# Patient Record
Sex: MALE | URBAN - METROPOLITAN AREA
[De-identification: names, ages, dates, MRNs, and addresses within clinical notes are randomized per-mention and may not be internally consistent; named-entity substitution may affect disease eponyms.]

---

## 2020-11-04 ENCOUNTER — APPOINTMENT (RX ONLY)
Dept: URBAN - METROPOLITAN AREA CLINIC 168 | Facility: CLINIC | Age: 65
Setting detail: DERMATOLOGY
End: 2020-11-04

## 2020-11-04 DIAGNOSIS — L73.2 HIDRADENITIS SUPPURATIVA: ICD-10-CM

## 2020-11-04 DIAGNOSIS — F17.200 NICOTINE DEPENDENCE, UNSPECIFIED, UNCOMPLICATED: ICD-10-CM

## 2020-11-04 PROCEDURE — ? COUNSELING

## 2020-11-04 PROCEDURE — 99201: CPT

## 2020-11-04 PROCEDURE — ? TREATMENT REGIMEN

## 2020-11-04 ASSESSMENT — HURLEY STAGE
IN YOUR EXPERIENCE, AMONG ALL PATIENTS YOU HAVE SEEN WITH THIS CONDITION, HOW SEVERE IS THIS PATIENT'S CONDITION?: HURLEY STAGE III: MULTIPLE INTERCONNECTED SINUS TRACTS AND ABSCESSES THROUGHOUT AN ENTIRE AREA

## 2020-11-04 ASSESSMENT — LOCATION DETAILED DESCRIPTION DERM
LOCATION DETAILED: RIGHT INGUINAL CREASE
LOCATION DETAILED: LEFT INGUINAL CREASE
LOCATION DETAILED: GLUTEAL CLEFT

## 2020-11-04 ASSESSMENT — LOCATION ZONE DERM: LOCATION ZONE: TRUNK

## 2020-11-04 ASSESSMENT — LOCATION SIMPLE DESCRIPTION DERM
LOCATION SIMPLE: GLUTEAL CLEFT
LOCATION SIMPLE: GROIN

## 2020-11-04 NOTE — PROCEDURE: TREATMENT REGIMEN
Plan: Pt has severe disease. Education given and reviewed new therapies for HS are much more effective than past options of chronic antibiotics. \\n\\nDiscussed treating with Humira vs. one of the medications we have in clinic trial, reviewed overall the treatment and side effects of Humira and the Jak inhibitor we are currently in clinical trial. \\n\\nPatient is hesitant with starting either Humira or clinical trial medications immediately, notes he would like to think about options and will call  us after he completes an upcoming oral surgery. Discussed we can order the biologics blood work as well if  he wishes, he declined now but can call for an order if he choses. \\n\\nPatient will call if he decides he would like to have blood work completed.
Detail Level: Simple

## 2020-11-04 NOTE — HPI: RASH (HIDRADENITIS SUPPURATIVA)
How Severe Is It?: severe
Is This A New Presentation, Or A Follow-Up?: Rash
Additional History: Patient states no history of taking steroids. He has taken antibiotics on and off for about 1-2 months at a time, with the last course given to him about a year ago. Patient states his dad has a history of the same disease. He has had about 8 surgeries to remove lesions. He sees Dr. Bustos once a month in follow up who referred him here to investigate if there are any new treatments. He has never been on an injectable med for his HS. \\n\\nHe denies any h/o TB exposure, Valley fever or hepatitis, no personal or family h/o Multiple sclerosis. \\n\\nHe notes in the past another doctor wanted to prescribe prednisone, but he did not take it as it was only a temporary fix.